# Patient Record
Sex: MALE | URBAN - METROPOLITAN AREA
[De-identification: names, ages, dates, MRNs, and addresses within clinical notes are randomized per-mention and may not be internally consistent; named-entity substitution may affect disease eponyms.]

---

## 2020-02-12 ENCOUNTER — LAB REQUISITION (OUTPATIENT)
Dept: LAB | Age: 75
End: 2020-02-12

## 2020-02-12 PROCEDURE — 88305 TISSUE EXAM BY PATHOLOGIST: CPT | Performed by: CLINICAL MEDICAL LABORATORY

## 2020-02-14 LAB
CASE RPRT: NORMAL
PATH REPORT.FINAL DX SPEC: NORMAL
PATH REPORT.GROSS SPEC: NORMAL
PATH REPORT.MICROSCOPIC SPEC OTHER STN: NORMAL

## 2020-06-15 ENCOUNTER — LAB REQUISITION (OUTPATIENT)
Dept: LAB | Age: 75
End: 2020-06-15

## 2020-06-15 DIAGNOSIS — R80.9 PROTEINURIA, UNSPECIFIED: ICD-10-CM

## 2020-06-15 DIAGNOSIS — E11.29 TYPE 2 DIABETES MELLITUS WITH OTHER DIABETIC KIDNEY COMPLICATION (CMD): ICD-10-CM

## 2020-06-15 LAB
CREAT UR-MCNC: 138 MG/DL
MICROALBUMIN UR-MCNC: 4.55 MCG/MIN
MICROALBUMIN/CREAT UR: 33 MG/G

## 2020-06-15 PROCEDURE — PSEU8567 MICROALBUMIN URINE RANDOM: Performed by: CLINICAL MEDICAL LABORATORY

## 2020-06-15 PROCEDURE — 82043 UR ALBUMIN QUANTITATIVE: CPT | Performed by: CLINICAL MEDICAL LABORATORY

## 2020-06-15 PROCEDURE — 82570 ASSAY OF URINE CREATININE: CPT | Performed by: CLINICAL MEDICAL LABORATORY

## 2020-12-10 ENCOUNTER — LAB REQUISITION (OUTPATIENT)
Dept: LAB | Age: 75
End: 2020-12-10

## 2020-12-10 DIAGNOSIS — R80.9 PROTEINURIA, UNSPECIFIED: ICD-10-CM

## 2020-12-10 DIAGNOSIS — D64.9 ANEMIA, UNSPECIFIED: ICD-10-CM

## 2020-12-10 LAB
CREAT UR-MCNC: 168 MG/DL
FOLATE SERPL-MCNC: 17.6 NG/ML
MICROALBUMIN UR-MCNC: 5.74 MG/DL
MICROALBUMIN/CREAT UR: 34.2 MG/G
VIT B12 SERPL-MCNC: 605 PG/ML (ref 211–911)

## 2020-12-10 PROCEDURE — PSEU8260 FOLATE: Performed by: CLINICAL MEDICAL LABORATORY

## 2020-12-10 PROCEDURE — 87086 URINE CULTURE/COLONY COUNT: CPT | Performed by: CLINICAL MEDICAL LABORATORY

## 2020-12-10 PROCEDURE — 82043 UR ALBUMIN QUANTITATIVE: CPT | Performed by: CLINICAL MEDICAL LABORATORY

## 2020-12-10 PROCEDURE — PSEU9005 URINE, BACTERIAL CULTURE: Performed by: CLINICAL MEDICAL LABORATORY

## 2020-12-10 PROCEDURE — 82607 VITAMIN B-12: CPT | Performed by: CLINICAL MEDICAL LABORATORY

## 2020-12-10 PROCEDURE — 82570 ASSAY OF URINE CREATININE: CPT | Performed by: CLINICAL MEDICAL LABORATORY

## 2020-12-10 PROCEDURE — PSEU8306 VITAMIN B12: Performed by: CLINICAL MEDICAL LABORATORY

## 2020-12-10 PROCEDURE — 82746 ASSAY OF FOLIC ACID SERUM: CPT | Performed by: CLINICAL MEDICAL LABORATORY

## 2020-12-10 PROCEDURE — PSEU8567 MICROALBUMIN URINE RANDOM: Performed by: CLINICAL MEDICAL LABORATORY

## 2020-12-11 LAB — BACTERIA UR CULT: NORMAL

## 2021-06-08 ENCOUNTER — LAB REQUISITION (OUTPATIENT)
Dept: LAB | Age: 76
End: 2021-06-08

## 2021-06-08 DIAGNOSIS — R80.9 PROTEINURIA, UNSPECIFIED: ICD-10-CM

## 2021-06-08 DIAGNOSIS — E11.29 TYPE 2 DIABETES MELLITUS WITH OTHER DIABETIC KIDNEY COMPLICATION (CMD): ICD-10-CM

## 2021-06-08 LAB — HBA1C MFR BLD: 6.1 % (ref 4.5–5.6)

## 2021-06-08 PROCEDURE — 82043 UR ALBUMIN QUANTITATIVE: CPT | Performed by: CLINICAL MEDICAL LABORATORY

## 2021-06-08 PROCEDURE — 82570 ASSAY OF URINE CREATININE: CPT | Performed by: CLINICAL MEDICAL LABORATORY

## 2021-06-08 PROCEDURE — PSEU8266 GLYCOHEMOGLOBIN: Performed by: CLINICAL MEDICAL LABORATORY

## 2021-06-08 PROCEDURE — PSEU8567 MICROALBUMIN URINE RANDOM: Performed by: CLINICAL MEDICAL LABORATORY

## 2021-06-08 PROCEDURE — 83036 HEMOGLOBIN GLYCOSYLATED A1C: CPT | Performed by: CLINICAL MEDICAL LABORATORY

## 2021-06-09 LAB
CREAT UR-MCNC: 188 MG/DL
MICROALBUMIN UR-MCNC: 6.29 MG/DL
MICROALBUMIN/CREAT UR: 33.5 MG/G

## 2021-08-08 ENCOUNTER — HOSPITAL ENCOUNTER (EMERGENCY)
Dept: HOSPITAL 11 - JP.ED | Age: 76
Discharge: HOME | End: 2021-08-08
Payer: MEDICARE

## 2021-08-08 DIAGNOSIS — Z79.82: ICD-10-CM

## 2021-08-08 DIAGNOSIS — E11.9: ICD-10-CM

## 2021-08-08 DIAGNOSIS — Z79.84: ICD-10-CM

## 2021-08-08 DIAGNOSIS — J40: Primary | ICD-10-CM

## 2021-08-08 DIAGNOSIS — Z79.899: ICD-10-CM

## 2021-08-08 DIAGNOSIS — Z20.822: ICD-10-CM

## 2021-08-08 DIAGNOSIS — E66.9: ICD-10-CM

## 2021-08-08 DIAGNOSIS — I10: ICD-10-CM

## 2021-08-08 DIAGNOSIS — M10.9: ICD-10-CM

## 2021-08-08 PROCEDURE — 94640 AIRWAY INHALATION TREATMENT: CPT

## 2021-08-08 PROCEDURE — 99285 EMERGENCY DEPT VISIT HI MDM: CPT

## 2021-08-08 PROCEDURE — 36415 COLL VENOUS BLD VENIPUNCTURE: CPT

## 2021-08-08 PROCEDURE — 84484 ASSAY OF TROPONIN QUANT: CPT

## 2021-08-08 PROCEDURE — 85025 COMPLETE CBC W/AUTO DIFF WBC: CPT

## 2021-08-08 PROCEDURE — 84145 PROCALCITONIN (PCT): CPT

## 2021-08-08 PROCEDURE — U0002 COVID-19 LAB TEST NON-CDC: HCPCS

## 2021-08-08 PROCEDURE — 71046 X-RAY EXAM CHEST 2 VIEWS: CPT

## 2021-08-08 PROCEDURE — 83605 ASSAY OF LACTIC ACID: CPT

## 2021-08-08 PROCEDURE — 80053 COMPREHEN METABOLIC PANEL: CPT

## 2021-08-08 NOTE — EDM.PDOC
ED HPI GENERAL MEDICAL PROBLEM





- General


Chief Complaint: Respiratory Problem


Stated Complaint: BREATHING ISSUES


Time Seen by Provider: 08/08/21 10:08


Source of Information: Reports: Patient, Family, RN Notes Reviewed


History Limitations: Reports: No Limitations





- History of Present Illness


INITIAL COMMENTS - FREE TEXT/NARRATIVE: 





76-year-old gentleman presents emergency department day complaint of shortness 

of breath, he has been ill for about 24 hours has not had any fevers he has had 

brown sputum production is concerned he may have pneumonia which he has had in 

the past.  Denies any fevers nausea vomiting chest pain.  Did have Covid April 2021 did receive monoclonal antibody therapy is not vaccinated





- Related Data


                                    Allergies











Allergy/AdvReac Type Severity Reaction Status Date / Time


 


No Known Allergies Allergy   Verified 08/08/21 09:48











Home Meds: 


                                    Home Meds





Aspirin [Halfprin] 81 mg PO DAILY 08/08/21 [History]


Ezetimibe [Zetia] 10 mg PO DAILY 08/08/21 [History]


Losartan [Cozaar] 100 mg PO DAILY 08/08/21 [History]


carvediloL [Carvedilol] 3.125 mg PO DAILY 08/08/21 [History]


metFORMIN [Glucophage XR] 2,000 mg PO DAILY 08/08/21 [History]











Past Medical History


HEENT History: Reports: Impaired Vision


Cardiovascular History: Reports: Blood Clots/VTE/DVT, High Cholesterol, 

Hypertension


Respiratory History: Reports: Pneumonia, Recurrent


Musculoskeletal History: Reports: Gout


Endocrine/Metabolic History: Reports: Diabetes, Type II, Obesity/BMI 30+





- Infectious Disease History


Infectious Disease History: Reports: Chicken Pox, Measles, Novel Coronavirus





- Past Surgical History


Head Surgeries/Procedures: Reports: None


HEENT Surgical History: Reports: Adenoidectomy, Tonsillectomy


Cardiovascular Surgical History: Reports: None


Respiratory Surgical History: Reports: None


GI Surgical History: Reports: Hernia, Abdominal


Endocrine Surgical History: Reports: None


Musculoskeletal Surgical History: Reports: Other (See Below)


Other Musculoskeletal Surgeries/Procedures:: left ankle





Social & Family History





- Tobacco Use


Tobacco Use Status *Q: Never Tobacco User


Second Hand Smoke Exposure: No





- Caffeine Use


Caffeine Use: Reports: Coffee





- Alcohol Use


Days Per Week of Alcohol Use: 7


Number of Drinks Per Day: 2


Total Drinks Per Week: 14





- Recreational Drug Use


Recreational Drug Use: No





ED ROS GENERAL





- Review of Systems


Review Of Systems: See Below


Constitutional: Denies: Fever, Chills


HEENT: Reports: No Symptoms


Respiratory: Reports: Shortness of Breath, Cough, Sputum.  Denies: Wheezing


Cardiovascular: Reports: Dyspnea on Exertion.  Denies: Chest Pain


GI/Abdominal: Reports: No Symptoms





ED EXAM, GENERAL





- Physical Exam


Exam: See Below


Exam Limited By: No Limitations


General Appearance: Alert, WD/WN, No Apparent Distress


Respiratory/Chest: No Accessory Muscle Use, Chest Non-Tender, Decreased Breath 

Sounds.  No: Crackles, Rales, Rhonchi, Wheezing


Cardiovascular: Regular Rate, Rhythm, No Murmur


GI/Abdominal: Soft, Non-Tender





Course





- Vital Signs


Last Recorded V/S: 


                                Last Vital Signs











Temp  98.2 F   08/08/21 09:57


 


Pulse  82   08/08/21 09:57


 


Resp  16   08/08/21 09:57


 


BP  140/59 L  08/08/21 09:57


 


Pulse Ox  88 L  08/08/21 09:57














- Orders/Labs/Meds


Orders: 


                               Active Orders 24 hr











 Category Date Time Status


 


 RT Aerosol Therapy [RC] ASDIRECTED Care  08/08/21 10:15 Active


 


 Chest 2V [CR] Urgent Exams  08/08/21 10:12 Taken











Labs: 


                                Laboratory Tests











  08/08/21 08/08/21 08/08/21 Range/Units





  10:25 10:25 10:25 


 


WBC  11.2 H    (4.5-11.0)  K/uL


 


RBC  3.94 L    (4.30-5.90)  M/uL


 


Hgb  12.3    (12.0-15.0)  g/dL


 


Hct  36.5 L    (40.0-54.0)  %


 


MCV  93    (80-98)  fL


 


MCH  31    (27-31)  pg


 


MCHC  34    (32-36)  %


 


Plt Count  155    (150-400)  K/uL


 


Neut % (Auto)  84.5 H    (36-66)  %


 


Lymph % (Auto)  8.4 L    (24-44)  %


 


Mono % (Auto)  6.2 H    (2-6)  %


 


Eos % (Auto)  0.7 L    (2-4)  %


 


Baso % (Auto)  0.2    (0-1)  %


 


Sodium   142   (140-148)  mmol/L


 


Potassium   3.7   (3.6-5.2)  mmol/L


 


Chloride   104   (100-108)  mmol/L


 


Carbon Dioxide   24   (21-32)  mmol/L


 


Anion Gap   14.2 H   (5.0-14.0)  mmol/L


 


BUN   30 H   (7-18)  mg/dL


 


Creatinine   1.2   (0.8-1.3)  mg/dL


 


Est Cr Clr Drug Dosing   53.19   mL/min


 


Estimated GFR (MDRD)   59 L   (>60)  


 


Glucose   136 H   ()  mg/dL


 


Lactic Acid     (0.4-2.0)  mmol/L


 


Calcium   8.6   (8.5-10.1)  mg/dL


 


Total Bilirubin   0.3   (0.2-1.0)  mg/dL


 


AST   14 L   (15-37)  U/L


 


ALT   22   (12-78)  U/L


 


Alkaline Phosphatase   68   ()  U/L


 


Troponin I     (0.000-0.056)  ng/mL


 


Total Protein   6.6   (6.4-8.2)  g/dL


 


Albumin   3.5   (3.4-5.0)  g/dL


 


Globulin   3.1   (2.3-3.5)  g/dL


 


Albumin/Globulin Ratio   1.1 L   (1.2-2.2)  


 


Procalcitonin     ng/mL


 


SARS CoV-2 RNA Rapid LAN    Negative  














  08/08/21 08/08/21 08/08/21 Range/Units





  10:25 10:25 10:25 


 


WBC     (4.5-11.0)  K/uL


 


RBC     (4.30-5.90)  M/uL


 


Hgb     (12.0-15.0)  g/dL


 


Hct     (40.0-54.0)  %


 


MCV     (80-98)  fL


 


MCH     (27-31)  pg


 


MCHC     (32-36)  %


 


Plt Count     (150-400)  K/uL


 


Neut % (Auto)     (36-66)  %


 


Lymph % (Auto)     (24-44)  %


 


Mono % (Auto)     (2-6)  %


 


Eos % (Auto)     (2-4)  %


 


Baso % (Auto)     (0-1)  %


 


Sodium     (140-148)  mmol/L


 


Potassium     (3.6-5.2)  mmol/L


 


Chloride     (100-108)  mmol/L


 


Carbon Dioxide     (21-32)  mmol/L


 


Anion Gap     (5.0-14.0)  mmol/L


 


BUN     (7-18)  mg/dL


 


Creatinine     (0.8-1.3)  mg/dL


 


Est Cr Clr Drug Dosing     mL/min


 


Estimated GFR (MDRD)     (>60)  


 


Glucose     ()  mg/dL


 


Lactic Acid   1.7   (0.4-2.0)  mmol/L


 


Calcium     (8.5-10.1)  mg/dL


 


Total Bilirubin     (0.2-1.0)  mg/dL


 


AST     (15-37)  U/L


 


ALT     (12-78)  U/L


 


Alkaline Phosphatase     ()  U/L


 


Troponin I  < 0.017    (0.000-0.056)  ng/mL


 


Total Protein     (6.4-8.2)  g/dL


 


Albumin     (3.4-5.0)  g/dL


 


Globulin     (2.3-3.5)  g/dL


 


Albumin/Globulin Ratio     (1.2-2.2)  


 


Procalcitonin    0.05  ng/mL


 


SARS CoV-2 RNA Rapid LAN     











Meds: 


Medications














Discontinued Medications














Generic Name Dose Route Start Last Admin





  Trade Name Freq  PRN Reason Stop Dose Admin


 


Albuterol/Ipratropium  3 ml  08/08/21 10:14  08/08/21 10:18





  Albuterol/Ipratropium 3.0-0.5 Mg/3 Ml Neb Soln  NEB  08/08/21 10:15  3 ml





  ONETIME ONE   Administration














Departure





- Departure


Time of Disposition: 11:32


Disposition: Home, Self-Care 01


Condition: Fair


Clinical Impression: 


 Bronchitis








- Discharge Information


Instructions:  Acute Bronchitis, Adult, Easy-to-Read


Referrals: 


PCP,None [Primary Care Provider] - 


Forms:  ED Department Discharge


Additional Instructions: 


Take full course of antibiotics, use your albuterol inhaler as needed for 

shortness of breath symptoms,  please followup with your primary care provider 

in 3-5 days if not better, please call return to the emergency department with 

worsening of symptoms.





Sepsis Event Note (ED)





- Evaluation


Sepsis Screening Result: No Definite Risk





- Focused Exam


Vital Signs: 


                                   Vital Signs











  Temp Pulse Resp BP Pulse Ox


 


 08/08/21 09:57  98.2 F  82  16  140/59 L  88 L


 


 08/08/21 09:42  98.2 F  82  16  140/59 L  88 L














- My Orders


Last 24 Hours: 


My Active Orders





08/08/21 10:12


Chest 2V [CR] Urgent 





08/08/21 10:15


RT Aerosol Therapy [RC] ASDIRECTED 














- Assessment/Plan


Last 24 Hours: 


My Active Orders





08/08/21 10:12


Chest 2V [CR] Urgent 





08/08/21 10:15


RT Aerosol Therapy [RC] ASDIRECTED 











Plan: 





Assessment





Acuity = acute





Site and laterality = bronchitis





Etiology  = unknown





Manifestations = hypoxia resolved with bronchodilators





Location of injury =  Home





Lab values = CBC, CMP, troponin, lactic acid, procalcitonin all within normal 

limits, chest x-ray I did review films myself I cannot appreciate any acute 

process, the official read from radiology is pending, Covid was negative





Plan


I did review lab work chest x-ray results with him he is placed on antibiotic 

azithromycin 5-day course as well as an albuterol inhaler he will follow-up with

 his primary care upon return home

















 This note was dictated using dragon voice recognition software please call with

 any questions on syntax or grammar.

## 2021-08-09 NOTE — CR
CHEST: 2 view

 

CLINICAL HISTORY:SOB

 

COMPARISON:None

 

FINDINGS:  The heart size, pulmonary vascularity and hilar structures are

normal. No infiltrate effusion or pneumothorax is seen.

 

IMPRESSION: No acute cardiopulmonary process.

## 2021-12-14 ENCOUNTER — LAB REQUISITION (OUTPATIENT)
Dept: LAB | Age: 76
End: 2021-12-14

## 2021-12-14 DIAGNOSIS — E11.29 TYPE 2 DIABETES MELLITUS WITH OTHER DIABETIC KIDNEY COMPLICATION (CMD): ICD-10-CM

## 2021-12-14 DIAGNOSIS — E11.51 TYPE 2 DIABETES MELLITUS WITH DIABETIC PERIPHERAL ANGIOPATHY WITHOUT GANGRENE (CMD): ICD-10-CM

## 2021-12-14 DIAGNOSIS — R80.9 PROTEINURIA, UNSPECIFIED: ICD-10-CM

## 2021-12-14 LAB — HBA1C MFR BLD: 6.3 % (ref 4.5–5.6)

## 2021-12-14 PROCEDURE — PSEU8266 GLYCOHEMOGLOBIN: Performed by: CLINICAL MEDICAL LABORATORY

## 2021-12-14 PROCEDURE — PSEU8567 MICROALBUMIN URINE RANDOM: Performed by: CLINICAL MEDICAL LABORATORY

## 2021-12-14 PROCEDURE — 82043 UR ALBUMIN QUANTITATIVE: CPT | Performed by: CLINICAL MEDICAL LABORATORY

## 2021-12-14 PROCEDURE — 83036 HEMOGLOBIN GLYCOSYLATED A1C: CPT | Performed by: CLINICAL MEDICAL LABORATORY

## 2021-12-14 PROCEDURE — 82570 ASSAY OF URINE CREATININE: CPT | Performed by: CLINICAL MEDICAL LABORATORY

## 2021-12-15 LAB
CREAT UR-MCNC: 234 MG/DL
MICROALBUMIN UR-MCNC: 32.1 MG/DL
MICROALBUMIN/CREAT UR: 137.2 MG/G

## 2022-06-16 ENCOUNTER — LAB REQUISITION (OUTPATIENT)
Dept: LAB | Age: 77
End: 2022-06-16

## 2022-06-16 DIAGNOSIS — E11.51 TYPE 2 DIABETES MELLITUS WITH DIABETIC PERIPHERAL ANGIOPATHY WITHOUT GANGRENE (CMD): ICD-10-CM

## 2022-06-16 PROCEDURE — PSEU8567 MICROALBUMIN URINE RANDOM: Performed by: CLINICAL MEDICAL LABORATORY

## 2022-06-16 PROCEDURE — PSEU8266 GLYCOHEMOGLOBIN: Performed by: CLINICAL MEDICAL LABORATORY

## 2022-06-16 PROCEDURE — 82043 UR ALBUMIN QUANTITATIVE: CPT | Performed by: CLINICAL MEDICAL LABORATORY

## 2022-06-16 PROCEDURE — 83036 HEMOGLOBIN GLYCOSYLATED A1C: CPT | Performed by: CLINICAL MEDICAL LABORATORY

## 2022-06-16 PROCEDURE — 82570 ASSAY OF URINE CREATININE: CPT | Performed by: CLINICAL MEDICAL LABORATORY

## 2022-06-17 LAB
CREAT UR-MCNC: 231 MG/DL
HBA1C MFR BLD: 6.5 % (ref 4.5–5.6)
MICROALBUMIN UR-MCNC: 14.7 MG/DL
MICROALBUMIN/CREAT UR: 63.6 MG/G

## 2022-09-16 ENCOUNTER — LAB REQUISITION (OUTPATIENT)
Dept: LAB | Age: 77
End: 2022-09-16

## 2022-09-16 DIAGNOSIS — Z11.59 ENCOUNTER FOR SCREENING FOR OTHER VIRAL DISEASES: ICD-10-CM

## 2022-09-16 PROCEDURE — U0005 INFEC AGEN DETEC AMPLI PROBE: HCPCS | Performed by: CLINICAL MEDICAL LABORATORY

## 2022-09-16 PROCEDURE — PSEU10635 2019 NOVEL CORONAVIRUS (SARS-COV-2): Performed by: CLINICAL MEDICAL LABORATORY

## 2022-09-16 PROCEDURE — U0003 INFECTIOUS AGENT DETECTION BY NUCLEIC ACID (DNA OR RNA); SEVERE ACUTE RESPIRATORY SYNDROME CORONAVIRUS 2 (SARS-COV-2) (CORONAVIRUS DISEASE [COVID-19]), AMPLIFIED PROBE TECHNIQUE, MAKING USE OF HIGH THROUGHPUT TECHNOLOGIES AS DESCRIBED BY CMS-2020-01-R: HCPCS | Performed by: CLINICAL MEDICAL LABORATORY

## 2022-09-17 LAB
SARS-COV-2 RNA RESP QL NAA+PROBE: NOT DETECTED
SERVICE CMNT-IMP: NORMAL
SERVICE CMNT-IMP: NORMAL

## 2022-12-16 ENCOUNTER — LAB REQUISITION (OUTPATIENT)
Dept: LAB | Age: 77
End: 2022-12-16

## 2022-12-16 DIAGNOSIS — E11.29 TYPE 2 DIABETES MELLITUS WITH OTHER DIABETIC KIDNEY COMPLICATION (CMD): ICD-10-CM

## 2022-12-16 DIAGNOSIS — D64.9 ANEMIA, UNSPECIFIED: ICD-10-CM

## 2022-12-16 DIAGNOSIS — R80.9 PROTEINURIA, UNSPECIFIED: ICD-10-CM

## 2022-12-16 LAB
HBA1C MFR BLD: 6.2 % (ref 4.5–5.6)
VIT B12 SERPL-MCNC: 600 PG/ML (ref 211–911)

## 2022-12-16 PROCEDURE — 82570 ASSAY OF URINE CREATININE: CPT | Performed by: CLINICAL MEDICAL LABORATORY

## 2022-12-16 PROCEDURE — PSEU8567 MICROALBUMIN URINE RANDOM: Performed by: CLINICAL MEDICAL LABORATORY

## 2022-12-16 PROCEDURE — 82746 ASSAY OF FOLIC ACID SERUM: CPT | Performed by: CLINICAL MEDICAL LABORATORY

## 2022-12-16 PROCEDURE — 82043 UR ALBUMIN QUANTITATIVE: CPT | Performed by: CLINICAL MEDICAL LABORATORY

## 2022-12-16 PROCEDURE — 82607 VITAMIN B-12: CPT | Performed by: CLINICAL MEDICAL LABORATORY

## 2022-12-16 PROCEDURE — PSEU8306 VITAMIN B12: Performed by: CLINICAL MEDICAL LABORATORY

## 2022-12-16 PROCEDURE — 83036 HEMOGLOBIN GLYCOSYLATED A1C: CPT | Performed by: CLINICAL MEDICAL LABORATORY

## 2022-12-16 PROCEDURE — PSEU8266 GLYCOHEMOGLOBIN: Performed by: CLINICAL MEDICAL LABORATORY

## 2022-12-16 PROCEDURE — PSEU8260 FOLATE: Performed by: CLINICAL MEDICAL LABORATORY

## 2022-12-17 LAB
CREAT UR-MCNC: 188 MG/DL
FOLATE SERPL-MCNC: 20.3 NG/ML
MICROALBUMIN UR-MCNC: 8.85 MG/DL
MICROALBUMIN/CREAT UR: 47.1 MG/G

## 2023-06-14 ENCOUNTER — LAB REQUISITION (OUTPATIENT)
Dept: LAB | Age: 78
End: 2023-06-14

## 2023-06-14 DIAGNOSIS — E11.29 TYPE 2 DIABETES MELLITUS WITH OTHER DIABETIC KIDNEY COMPLICATION  (CMD): ICD-10-CM

## 2023-06-14 DIAGNOSIS — R80.9 PROTEINURIA, UNSPECIFIED: ICD-10-CM

## 2023-06-14 LAB — HBA1C MFR BLD: 6.5 % (ref 4.5–5.6)

## 2023-06-14 PROCEDURE — PSEU8266 GLYCOHEMOGLOBIN: Performed by: CLINICAL MEDICAL LABORATORY

## 2023-06-14 PROCEDURE — 83036 HEMOGLOBIN GLYCOSYLATED A1C: CPT | Performed by: CLINICAL MEDICAL LABORATORY

## 2023-06-14 PROCEDURE — PSEU8567 MICROALBUMIN URINE RANDOM: Performed by: CLINICAL MEDICAL LABORATORY

## 2023-06-14 PROCEDURE — 82570 ASSAY OF URINE CREATININE: CPT | Performed by: CLINICAL MEDICAL LABORATORY

## 2023-06-14 PROCEDURE — 82043 UR ALBUMIN QUANTITATIVE: CPT | Performed by: CLINICAL MEDICAL LABORATORY

## 2023-06-15 LAB
CREAT UR-MCNC: 117 MG/DL
MICROALBUMIN UR-MCNC: 3.29 MG/DL
MICROALBUMIN/CREAT UR: 28.1 MG/G

## 2023-12-21 ENCOUNTER — LAB REQUISITION (OUTPATIENT)
Dept: LAB | Age: 78
End: 2023-12-21

## 2023-12-21 DIAGNOSIS — E11.51 TYPE 2 DIABETES MELLITUS WITH DIABETIC PERIPHERAL ANGIOPATHY WITHOUT GANGRENE  (CMD): ICD-10-CM

## 2023-12-21 LAB — HBA1C MFR BLD: 6.7 % (ref 4.5–5.6)

## 2023-12-21 PROCEDURE — 82570 ASSAY OF URINE CREATININE: CPT | Performed by: CLINICAL MEDICAL LABORATORY

## 2023-12-21 PROCEDURE — PSEU8266 GLYCOHEMOGLOBIN: Performed by: CLINICAL MEDICAL LABORATORY

## 2023-12-21 PROCEDURE — PSEU8567 MICROALBUMIN URINE RANDOM: Performed by: CLINICAL MEDICAL LABORATORY

## 2023-12-21 PROCEDURE — 82043 UR ALBUMIN QUANTITATIVE: CPT | Performed by: CLINICAL MEDICAL LABORATORY

## 2023-12-21 PROCEDURE — 83036 HEMOGLOBIN GLYCOSYLATED A1C: CPT | Performed by: CLINICAL MEDICAL LABORATORY

## 2023-12-22 LAB
CREAT UR-MCNC: 386 MG/DL
MICROALBUMIN UR-MCNC: 48.1 MG/DL
MICROALBUMIN/CREAT UR: 124.6 MG/G

## 2024-07-23 ENCOUNTER — LAB REQUISITION (OUTPATIENT)
Dept: LAB | Age: 79
End: 2024-07-23

## 2024-07-23 DIAGNOSIS — E11.29 TYPE 2 DIABETES MELLITUS WITH OTHER DIABETIC KIDNEY COMPLICATION  (CMD): ICD-10-CM

## 2024-07-23 DIAGNOSIS — R80.9 PROTEINURIA, UNSPECIFIED: ICD-10-CM

## 2024-07-23 LAB — HBA1C MFR BLD: 6.1 % (ref 4.5–5.6)

## 2024-07-23 PROCEDURE — 82043 UR ALBUMIN QUANTITATIVE: CPT | Performed by: CLINICAL MEDICAL LABORATORY

## 2024-07-23 PROCEDURE — 82570 ASSAY OF URINE CREATININE: CPT | Performed by: CLINICAL MEDICAL LABORATORY

## 2024-07-23 PROCEDURE — PSEU8567 MICROALBUMIN URINE RANDOM: Performed by: CLINICAL MEDICAL LABORATORY

## 2024-07-23 PROCEDURE — 83036 HEMOGLOBIN GLYCOSYLATED A1C: CPT | Performed by: CLINICAL MEDICAL LABORATORY

## 2024-07-23 PROCEDURE — PSEU8266 GLYCOHEMOGLOBIN: Performed by: CLINICAL MEDICAL LABORATORY

## 2024-07-24 LAB
CREAT UR-MCNC: 149 MG/DL
MICROALBUMIN UR-MCNC: 26.5 MG/DL
MICROALBUMIN/CREAT UR: 177.9 MG/G

## 2024-08-26 ENCOUNTER — LAB REQUISITION (OUTPATIENT)
Dept: LAB | Age: 79
End: 2024-08-26

## 2024-08-26 DIAGNOSIS — E11.29 TYPE 2 DIABETES MELLITUS WITH OTHER DIABETIC KIDNEY COMPLICATION (CMD): ICD-10-CM

## 2024-08-26 DIAGNOSIS — R80.9 PROTEINURIA, UNSPECIFIED: ICD-10-CM

## 2024-08-26 LAB
CREAT UR-MCNC: 159 MG/DL
MICROALBUMIN UR-MCNC: 26.5 MG/DL
MICROALBUMIN/CREAT UR: 166.7 MG/G

## 2024-08-26 PROCEDURE — 82043 UR ALBUMIN QUANTITATIVE: CPT | Performed by: CLINICAL MEDICAL LABORATORY

## 2024-08-26 PROCEDURE — 82570 ASSAY OF URINE CREATININE: CPT | Performed by: CLINICAL MEDICAL LABORATORY

## 2024-08-26 PROCEDURE — PSEU8567 MICROALBUMIN URINE RANDOM: Performed by: CLINICAL MEDICAL LABORATORY

## 2024-10-28 ENCOUNTER — LAB REQUISITION (OUTPATIENT)
Dept: LAB | Age: 79
End: 2024-10-28

## 2024-10-28 DIAGNOSIS — R80.9 PROTEINURIA, UNSPECIFIED: ICD-10-CM

## 2024-10-28 DIAGNOSIS — E11.29 TYPE 2 DIABETES MELLITUS WITH OTHER DIABETIC KIDNEY COMPLICATION (CMD): ICD-10-CM

## 2024-10-28 LAB — HBA1C MFR BLD: 6.6 % (ref 4.5–5.6)

## 2024-10-28 PROCEDURE — 83036 HEMOGLOBIN GLYCOSYLATED A1C: CPT | Performed by: CLINICAL MEDICAL LABORATORY

## 2024-10-28 PROCEDURE — PSEU8266 GLYCOHEMOGLOBIN: Performed by: CLINICAL MEDICAL LABORATORY

## 2024-12-09 ENCOUNTER — LAB REQUISITION (OUTPATIENT)
Dept: LAB | Age: 79
End: 2024-12-09

## 2024-12-09 DIAGNOSIS — R80.9 PROTEINURIA, UNSPECIFIED: ICD-10-CM

## 2024-12-09 DIAGNOSIS — E11.29 TYPE 2 DIABETES MELLITUS WITH OTHER DIABETIC KIDNEY COMPLICATION (CMD): ICD-10-CM

## 2024-12-09 DIAGNOSIS — D64.9 ANEMIA, UNSPECIFIED: ICD-10-CM

## 2024-12-09 LAB
FOLATE SERPL-MCNC: 23.7 NG/ML
HBA1C MFR BLD: 6.2 % (ref 4.5–5.6)

## 2024-12-09 PROCEDURE — 83036 HEMOGLOBIN GLYCOSYLATED A1C: CPT | Performed by: CLINICAL MEDICAL LABORATORY

## 2024-12-09 PROCEDURE — 82607 VITAMIN B-12: CPT | Performed by: CLINICAL MEDICAL LABORATORY

## 2024-12-09 PROCEDURE — PSEU8260 FOLATE: Performed by: CLINICAL MEDICAL LABORATORY

## 2024-12-09 PROCEDURE — PSEU8266 GLYCOHEMOGLOBIN: Performed by: CLINICAL MEDICAL LABORATORY

## 2024-12-09 PROCEDURE — 82570 ASSAY OF URINE CREATININE: CPT | Performed by: CLINICAL MEDICAL LABORATORY

## 2024-12-09 PROCEDURE — 82043 UR ALBUMIN QUANTITATIVE: CPT | Performed by: CLINICAL MEDICAL LABORATORY

## 2024-12-09 PROCEDURE — 82746 ASSAY OF FOLIC ACID SERUM: CPT | Performed by: CLINICAL MEDICAL LABORATORY

## 2024-12-09 PROCEDURE — PSEU8567 MICROALBUMIN URINE RANDOM: Performed by: CLINICAL MEDICAL LABORATORY

## 2024-12-09 PROCEDURE — PSEU8306 VITAMIN B12: Performed by: CLINICAL MEDICAL LABORATORY

## 2024-12-10 LAB
CREAT UR-MCNC: 142 MG/DL
MICROALBUMIN UR-MCNC: 32.9 MG/DL
MICROALBUMIN/CREAT UR: 231.7 MG/G
VIT B12 SERPL-MCNC: 531 PG/ML (ref 211–911)

## 2025-02-04 ENCOUNTER — LAB REQUISITION (OUTPATIENT)
Dept: LAB | Age: 80
End: 2025-02-04

## 2025-02-04 DIAGNOSIS — R80.9 PROTEINURIA, UNSPECIFIED: ICD-10-CM

## 2025-02-04 DIAGNOSIS — E11.29 TYPE 2 DIABETES MELLITUS WITH OTHER DIABETIC KIDNEY COMPLICATION (CMD): ICD-10-CM

## 2025-02-04 PROCEDURE — 82570 ASSAY OF URINE CREATININE: CPT | Performed by: CLINICAL MEDICAL LABORATORY

## 2025-02-04 PROCEDURE — PSEU8567 MICROALBUMIN URINE RANDOM: Performed by: CLINICAL MEDICAL LABORATORY

## 2025-02-04 PROCEDURE — 82043 UR ALBUMIN QUANTITATIVE: CPT | Performed by: CLINICAL MEDICAL LABORATORY

## 2025-02-05 LAB
CREAT UR-MCNC: 138 MG/DL
MICROALBUMIN UR-MCNC: 23.1 MG/DL
MICROALBUMIN/CREAT UR: 167.4 MG/G

## 2025-06-24 ENCOUNTER — LAB REQUISITION (OUTPATIENT)
Dept: LAB | Age: 80
End: 2025-06-24

## 2025-07-22 ENCOUNTER — LAB REQUISITION (OUTPATIENT)
Dept: LAB | Age: 80
End: 2025-07-22